# Patient Record
Sex: FEMALE | Race: WHITE | Employment: FULL TIME | URBAN - METROPOLITAN AREA
[De-identification: names, ages, dates, MRNs, and addresses within clinical notes are randomized per-mention and may not be internally consistent; named-entity substitution may affect disease eponyms.]

---

## 2023-03-17 ENCOUNTER — OFFICE VISIT (OUTPATIENT)
Dept: PRIMARY CARE CLINIC | Age: 23
End: 2023-03-17
Payer: COMMERCIAL

## 2023-03-17 ENCOUNTER — NURSE TRIAGE (OUTPATIENT)
Dept: OTHER | Facility: CLINIC | Age: 23
End: 2023-03-17

## 2023-03-17 VITALS — HEART RATE: 86 BPM | OXYGEN SATURATION: 99 % | BODY MASS INDEX: 27.32 KG/M2 | WEIGHT: 164 LBS | HEIGHT: 65 IN

## 2023-03-17 DIAGNOSIS — J30.2 SEASONAL ALLERGIES: ICD-10-CM

## 2023-03-17 DIAGNOSIS — J34.89 RHINORRHEA: ICD-10-CM

## 2023-03-17 DIAGNOSIS — H65.193 OTHER NON-RECURRENT ACUTE NONSUPPURATIVE OTITIS MEDIA OF BOTH EARS: Primary | ICD-10-CM

## 2023-03-17 DIAGNOSIS — J02.9 SORE THROAT: ICD-10-CM

## 2023-03-17 DIAGNOSIS — R09.81 NASAL CONGESTION: ICD-10-CM

## 2023-03-17 PROCEDURE — 99203 OFFICE O/P NEW LOW 30 MIN: CPT

## 2023-03-17 RX ORDER — AMOXICILLIN AND CLAVULANATE POTASSIUM 875; 125 MG/1; MG/1
1 TABLET, FILM COATED ORAL 2 TIMES DAILY
Qty: 20 TABLET | Refills: 0 | Status: SHIPPED | OUTPATIENT
Start: 2023-03-17 | End: 2023-03-27

## 2023-03-17 ASSESSMENT — ENCOUNTER SYMPTOMS
SORE THROAT: 1
EYES NEGATIVE: 1
RHINORRHEA: 1
GASTROINTESTINAL NEGATIVE: 1

## 2023-03-17 NOTE — PROGRESS NOTES
Florina Bishop (:  2000) is a 25 y.o. female,New patient, here for evaluation of the following chief complaint(s):  Pharyngitis (Patient presents today with 5 day history of illness. States she started with a sore throat on Monday. Progressed into stuffy nose, congestion for a couple of days. Now she has a swollen lymph node on her left side. Hurts to swallow. Mild neck pain as well. Neck is a little stiff also. Has tried Tylenol for her symptoms. ) and Neck Pain    Johny Oquendo is being seen today for the above complaints. She states that her sore throat went away, but now she feels like her neck is sore. Started moreso today, having difficulty swallowing. Denies cough, does have some ear pressure. Now states that her nasal congestion is cleared up. Denies any sinus pain or pressure, fever, n/v/d.        ASSESSMENT/PLAN:  1. Other non-recurrent acute nonsuppurative otitis media of both ears  -     amoxicillin-clavulanate (AUGMENTIN) 875-125 MG per tablet; Take 1 tablet by mouth 2 times daily for 10 days, Disp-20 tablet, R-0Normal  2. Rhinorrhea  3. Nasal congestion  4. Sore throat  5. Seasonal allergies    - Augmentin for bilateral AOM  - Recommend taking daily allergy medication, such as zyrtec, claritin, or allegra. - Discussed symptomatic management. May take tylenol or ibuprofen as needed for pain or fever. May use throat lozenges, warm teas and honey, salt water gargles, humidified air, drink cold fluids for relief. Increase fluid intake. - Patient education added to AVS    Return if symptoms worsen or fail to improve. Subjective   SUBJECTIVE/OBJECTIVE:  Review of Systems   Constitutional: Negative. HENT:  Positive for congestion, postnasal drip, rhinorrhea and sore throat. Eyes: Negative. Cardiovascular: Negative. Gastrointestinal: Negative. Endocrine: Negative. Musculoskeletal: Negative. Skin: Negative.     Allergic/Immunologic: Positive for environmental allergies. Neurological: Negative. Hematological: Negative. Psychiatric/Behavioral: Negative. Objective   Physical Exam  Vitals and nursing note reviewed. Constitutional:       Appearance: Normal appearance. HENT:      Head: Normocephalic. Right Ear: Tympanic membrane is erythematous. Left Ear: Tympanic membrane is erythematous. Nose: Congestion present. Mouth/Throat:      Mouth: Mucous membranes are moist.      Pharynx: Oropharynx is clear. Eyes:      Conjunctiva/sclera: Conjunctivae normal.   Cardiovascular:      Rate and Rhythm: Normal rate and regular rhythm. Pulses: Normal pulses. Heart sounds: Normal heart sounds. Pulmonary:      Effort: Pulmonary effort is normal.      Breath sounds: Normal breath sounds. Abdominal:      General: Bowel sounds are normal.      Palpations: Abdomen is soft. Musculoskeletal:         General: Normal range of motion. Cervical back: Normal range of motion. Skin:     General: Skin is warm. Capillary Refill: Capillary refill takes less than 2 seconds. Neurological:      General: No focal deficit present. Psychiatric:         Mood and Affect: Mood normal.        An electronic signature was used to authenticate this note.     --James Scott, APRN - CNP

## 2023-03-17 NOTE — TELEPHONE ENCOUNTER
[place]    Received call from Mehul Wilson at [facility]; Patient with Red Flag Complaint [facility] Subjective: Caller states \"On Monday my throat started hurting. It happened for 2 days. I got cold symptoms. Normally that happens when I get sick. Now my throat doesn't hurt at all. Yesterday I was laying down and I turned my head, I thought I pulled a muscle. It's on the left side, it looks a little puffy. It feels like a muscle. My throat doesn't hurt anymore. \"     Current Symptoms: swollen lymph node-left side of neck under jaw line, nasal congestion-resolved, sore throat-resolved, NO difficulty breathing, mild difficulty swallowing-able to swallow    Onset: 5 days ago; improving    Associated Symptoms: reduced activity    Pain Severity: 4/10; soreness; intermittent with moving neck    Temperature: Denies    What has been tried: Tylenol    LMP:  Now  Pregnant: No    Recommended disposition: See in Office Today. Patient is currently unestablished. Writer advised patient to be seen at [address], [facility] or ED Today, if unable to get new patient appointment in office. Patient agreeable. Care advice provided, patient verbalizes understanding; denies any other questions or concerns; instructed to call back for any new or worsening symptoms. Patient/Caller agrees with recommended disposition; writer provided warm transfer to [facility] at [facility] for appointment scheduling. Attention Provider: Thank you for allowing me to participate in the care of your patient. The patient was connected to triage in response to information provided to the [facility]. Please do not respond through this encounter as the response is not directed to a shared pool.     Reason for Disposition   Tender node in the neck and also has a sore throat with minimal/no runny nose or cough    Protocols used: Lymph Nodes - Swollen-ADULT-[place]